# Patient Record
(demographics unavailable — no encounter records)

---

## 2017-01-01 NOTE — ED.ADGEN
Adult General


Chief Complaint


Chief Complaint


Right ear redness





HPI


HPI





Patient is a 17-month-old  male who presents with right ear lobe 

redness and rash on the right side of his face. He was born full-term and mom 

states last night they stated a friend's house and he slept on a blanket on his 

right side and then noticed redness of his earlobe and a rash on the right side 

of his face. She states he's been eating and drinking normally having normal 

bowel movements. He's been acting normal. She states that he's has hypospadia 

and breathing issues and has a nebulizer at home that they use occasionally. 

She states he's behind on some of his vaccinations because they're homeless 

currently. She states he was perfectly fine last night when she laid him down 

on this blanket. She states he's been feeding normally and eating baby food 

appropriately. She states he had a similar rash several months ago when he had 

a ear infection on the left.





Review of Systems


Review of Systems





Constitutional: Denies fever or chills []


Eyes: Denies change in visual acuity, redness, or eye pain []


HENT: Denies nasal congestion or sore throat []


Respiratory: Denies cough or shortness of breath []


Cardiovascular: No additional information not addressed in HPI []


GI: Denies abdominal pain, nausea, vomiting, bloody stools or diarrhea []


: Denies dysuria or hematuria []


Musculoskeletal: Denies back pain or joint pain []


Integument: Positive for rash on right side face


Neurologic: Denies headache, focal weakness or sensory changes []


Endocrine: Denies polyuria or polydipsia []





Current Medications


Current Medications





Current Medications








 Medications


  (Trade)  Dose


 Ordered  Sig/Wendie  Start Time


 Stop Time Status Last Admin


Dose Admin


 


 Amoxicillin


  (Starter Pack -


 Amoxicillin 250mg/


 5ml 80ml)  1 startpack  1X  ONCE  9/25/17 10:40


 9/25/17 10:41   


 











Allergies


Allergies





Allergies








Coded Allergies Type Severity Reaction Last Updated Verified


 


  No Known Drug Allergies    9/25/17 No











Physical Exam


Physical Exam





Constitutional: Well developed, well nourished, no acute distress, non-toxic 

appearance. []


HENT: Normocephalic, atraumatic, bilateral external ears normal, oropharynx 

moist, no oral exudates, nose normal. Right TM erythematous


Eyes: PERRLA, EOMI, conjunctiva normal, no discharge. [] 


Neck: Normal range of motion, no tenderness, supple, no stridor. [] 


Cardiovascular:Heart rate regular rhythm, no murmur []


Lungs & Thorax:  Bilateral breath sounds clear to auscultation []


Abdomen: Bowel sounds normal, soft, no tenderness, no masses, no pulsatile 

masses. [] 


Skin: Warm, dry, no erythema, sand paper rash on the right earlobe with 

erythema and extensive the right cheek. [] 


Back: No tenderness, no CVA tenderness. [] 


Extremities: No tenderness, no cyanosis, no clubbing, ROM intact, no edema. [] 


Neurologic: Alert and interactive, normal motor function, normal sensory 

function, no focal deficits noted. []


Psychologic: Affect normal, judgement normal, mood normal. []





EKG


EKG


[]





Radiology/Procedures


Radiology/Procedures


[]





Course & Med Decision Making


Course & Med Decision Making


Pertinent Labs and Imaging studies reviewed. (See chart for details)





His rash could be secondary to the blanket he was laying on however he does 

have erythematous right eardrum. Well have mom start triple antibiotic ointment 

on his face and earlobe in addition to amoxicillin for the next 7 days. 

Prescription has been given in addition to the first dose in the emergency 

department. Mom's instructed follow-up with primary care within next week, 

return ER for fevers, not wanting to feed appropriately, the rash extends or 

spreads, or any other concerns. Vitals are within normal limits and the baby 

appears well therefore he is being discharged home. Systems has been given to 

the patient to get a new nebulizers your mask and the starter pack was sent 

with him said have 7 days of antibiotics since mom has no money. Information is 

also being given to mom regarding free clinics for follow-up and vaccinations.





Final Impression


Final Impression


Right sided facial rash


Right sided otitis media[]


Problems:  





Dragon Disclaimer


Dragon Disclaimer


This electronic medical record was generated, in whole or in part, using a 

voice recognition dictation system.





Departure


Departure:


Impression:  


 Primary Impression:  


 Otitis media in child


Disposition:  01 HOME, SELF-CARE


Condition:  STABLE


Patient Instructions:  Otitis Media, Adult, Easy-to-Read





Additional Instructions:  


He has an ear infection of his right ear and will need to be on antibiotics for 

the next 7 days. If he starts running a fever, doesn't want to eat or drink 

normally, starts acting different, becomes fussy, his rash on his face gets 

worse you have any other concerns please come back to the emergency department 

for further evaluation. You can use triple antibiotic ointment on his earlobe 

and face for the next several days to see if this doesn't improve his rash. He 

will need to follow-up with her primary care physician within the next 5-7 days.











NIYA BENAVIDES MD Sep 25, 2017 10:02